# Patient Record
Sex: MALE | Race: WHITE | URBAN - METROPOLITAN AREA
[De-identification: names, ages, dates, MRNs, and addresses within clinical notes are randomized per-mention and may not be internally consistent; named-entity substitution may affect disease eponyms.]

---

## 2017-04-11 ENCOUNTER — HOSPITAL ENCOUNTER (EMERGENCY)
Facility: CLINIC | Age: 32
Discharge: HOME | End: 2017-04-11
Attending: EMERGENCY MEDICINE | Admitting: EMERGENCY MEDICINE
Payer: OTHER MISCELLANEOUS

## 2017-04-11 VITALS
OXYGEN SATURATION: 98 % | HEART RATE: 70 BPM | TEMPERATURE: 97 F | SYSTOLIC BLOOD PRESSURE: 124 MMHG | DIASTOLIC BLOOD PRESSURE: 85 MMHG | RESPIRATION RATE: 14 BRPM

## 2017-04-11 DIAGNOSIS — Z77.21 HISTORY OF EXPOSURE TO BLOOD OR BODY FLUID: ICD-10-CM

## 2017-04-11 DIAGNOSIS — Z57.8 EMPLOYEE EXPOSURE TO BODY FLUIDS: ICD-10-CM

## 2017-04-11 PROCEDURE — 99283 EMERGENCY DEPT VISIT LOW MDM: CPT | Mod: Z6 | Performed by: EMERGENCY MEDICINE

## 2017-04-11 PROCEDURE — 99282 EMERGENCY DEPT VISIT SF MDM: CPT | Performed by: EMERGENCY MEDICINE

## 2017-04-11 SDOH — HEALTH STABILITY - PHYSICAL HEALTH: OCCUPATIONAL EXPOSURE TO OTHER RISK FACTORS: Z57.8

## 2017-04-11 ASSESSMENT — ENCOUNTER SYMPTOMS
FEVER: 0
SHORTNESS OF BREATH: 0
ABDOMINAL PAIN: 0

## 2017-04-11 NOTE — ED PROVIDER NOTES
History     Chief Complaint   Patient presents with     Body Fluid Exposure     finger was poked by long needle during procedure in OR     HPI  Leonardo Vigil is a 31 year old male who presents to the Emergency Department for evaluation of body fluid exposure. 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 The wound did bleed and he cleaned it. Source blood from the patient was obtained. No other concerns or complaints.     No past medical history on file.    Past Surgical History:   Procedure Laterality Date     GRAFT BONE TO FINGER  5/31/2012    Procedure:GRAFT BONE TO FINGER; Surgeon:ADEEL CALDERON; Location:[facility]     OPEN REDUCTION INTERNAL FIXATION WRIST  5/31/2012    Procedure:OPEN REDUCTION INTERNAL FIXATION WRIST; Left Open Reduction Internal and External Fixation of Capitate with Allograft Bone Grafting  ; Surgeon:ADEEL CALDERON; Location: OR     OPEN REDUCTION INTERNAL FIXATION WRIST      LEFT     REMOVE HARDWARE WRIST  7/12/2012    Procedure: REMOVE HARDWARE WRIST;  Left Capitate Pin Removal Times 2  ;  Surgeon: Adeel Calderon MD;  Location: [facility]       No family history on file.    Social History   Substance Use Topics     Smoking status: Never Smoker     Smokeless tobacco: Never Used     Alcohol use No       No current facility-administered medications for this encounter.      Current Outpatient Prescriptions   Medication     NO ACTIVE MEDICATIONS        Allergies   Allergen Reactions     Penicillins Hives     I have reviewed the Medications, Allergies, Past Medical and Surgical History, and Social History in the Epic system.    Review of Systems   Constitutional: Negative for fever.   Respiratory: Negative for shortness of breath.    Cardiovascular: Negative for chest pain.   Gastrointestinal: Negative for abdominal pain.   Skin:        Positive for needle stick.   All other systems  reviewed and are negative.      Physical Exam   BP: 124/85  Pulse: 70  Temp: 97  F (36.1  C)  Resp: 14  SpO2: 98 %  Physical Exam   Skin:   Small barely visible needle stick on left dorsum of hand; no bleeding; normal ROM of hand   Physical Exam   Constitutional: oriented to person, place, and time. appears well-developed and well-nourished.   HENT:   Head: Normocephalic and atraumatic.   Neck: Normal range of motion.   Pulmonary/Chest: Effort normal. No respiratory distress.   Neurological: alert and oriented to person, place, and time.   Skin: Skin is warm and dry.   Psychiatric:  normal mood and affect.  behavior is normal. Thought content normal.       ED Course     4:48 PM  The patient was seen and examined by Dr. Jarvis in [facility].     ED Course     Procedures             Critical Care time:  none               Labs Ordered and Resulted from Time of ED Arrival Up to the Time of Departure from the ED - No data to display    Assessments & Plan (with Medical Decision Making) source blood was drawn. Discussed risk of occupational exposure with pt. He agrees to f/u with employee health for further care.        I have reviewed the nursing notes.    I have reviewed the findings, diagnosis, plan and need for follow up with the patient.    New Prescriptions    No medications on file       Final diagnoses:   Employee exposure to body fluids     ISamantha, am serving as a trained medical scribe to document services personally performed by Filomena Jarvis MD, based on the provider's statements to me.      Filomena GARCIA MD, was physically present and have reviewed and verified the accuracy of this note documented by Samantha Maynard.     4/11/2017   [facility], EMERGENCY DEPARTMENT     Filomena Jarvis MD  04/11/17 [id number]

## 2017-04-11 NOTE — ED NOTES
Pt has seen the MD and nursing supervisor. He has been informed that he needs to do an [employer]. Pt thought he was ready to leave and left without the discharge instructions or discharge vital signs.

## 2017-04-11 NOTE — DISCHARGE INSTRUCTIONS
Please make an appointment to follow up with Employee Health Services (phone: [phone]) in 2-5 days as needed.